# Patient Record
(demographics unavailable — no encounter records)

---

## 2024-10-22 NOTE — PLAN
[FreeTextEntry1] : EKG for HLD- NSR   imp---hypertension---normotensive on amlodipine   hyperlipidemia---good panel on atorvastatin   hypothyroidism---clinically and biochem, euthyroid   mitral regurgitation---MVP and mild MR on echo in Aug. 2020    plan--FBW reviewed with pt.   continue current meds   echo Aug. 2023 (MR)   #severe osteopenia  s/p fosamax  bone scan 2023 negative   [ ] shingrix     I spent a total of 35 minutes on the date of this encounter that EXCLUDES time spent on AWV, preventive exam, depression screening, tobacco cessation, lung cancer screening and behavioral counseling.  This additional time included: Preparing to see the patient (e.g., review of test results) Obtaining and/or reviewing separately obtained history Performing a medically appropriate exam and/or evaluation Counseling and educating the patient/family/caregiver Ordering medications, tests, procedures Referring and communicating with other healthcare professionals, when not separately reported Documenting clinical information in the health record Care coordination not separately reported

## 2024-10-22 NOTE — HEALTH RISK ASSESSMENT
[Good] : ~his/her~  mood as  good [Yes] : Yes [4 or more  times a week (4 pts)] : 4 or more  times a week (4 points) [No] : In the past 12 months have you used drugs other than those required for medical reasons? No [No falls in past year] : Patient reported no falls in the past year [0] : 2) Feeling down, depressed, or hopeless: Not at all (0) [PHQ-2 Negative - No further assessment needed] : PHQ-2 Negative - No further assessment needed [Time Spent: ___ Minutes] : I spent [unfilled] minutes performing a depression screening for this patient. [Never] : Never [HIV Test offered] : HIV Test offered [Hepatitis C test offered] : Hepatitis C test offered [None] : None [With Family] : lives with family [# of Members in Household ___] :  household currently consist of [unfilled] member(s) [Retired] : retired [College] : College [] :  [# Of Children ___] : has [unfilled] children [Feels Safe at Home] : Feels safe at home [Fully functional (bathing, dressing, toileting, transferring, walking, feeding)] : Fully functional (bathing, dressing, toileting, transferring, walking, feeding) [Fully functional (using the telephone, shopping, preparing meals, housekeeping, doing laundry, using] : Fully functional and needs no help or supervision to perform IADLs (using the telephone, shopping, preparing meals, housekeeping, doing laundry, using transportation, managing medications and managing finances) [Reports normal functional visual acuity (ie: able to read med bottle)] : Reports normal functional visual acuity [Smoke Detector] : smoke detector [Carbon Monoxide Detector] : carbon monoxide detector [Seat Belt] :  uses seat belt [Sunscreen] : uses sunscreen [de-identified] : none [de-identified] : glass wine per day [de-identified] : exercise daily, walk  [de-identified] : veggies, fruits, 1 egg per day; yogurt, lean beef, chicken, whole wheat pasta, etc [[id number]] : 0 [Change in mental status noted] : No change in mental status noted [High Risk Behavior] : no high risk behavior [Reports changes in hearing] : Reports no changes in hearing [Reports changes in vision] : Reports no changes in vision [Reports changes in dental health] : Reports no changes in dental health [Travel to Developing Areas] : does not  travel to developing areas [TB Exposure] : is not being exposed to tuberculosis [ColonoscopyDate] : 6/23 [ColonoscopyComments] : 6/28 [FreeTextEntry2] :

## 2024-10-22 NOTE — HISTORY OF PRESENT ILLNESS
[FreeTextEntry1] : Patient presents today for follow-up of chronic medical conditions.  [de-identified] : Patient presents today for follow-up of chronic medical conditions.  Reports recent angular chelitis - requests treatment  Wife has returned to hospice - feels mood is stable, overall content with his daily routine   Still with intermittent belching ?Coffee trigger  Trial of PPI unsuccessful   Prior -  His wife suffers from late stage alzheimer's  Patient has been in good overall health this past year and has no active complaints. All data, labs, consult notes and studies reviewed.

## 2025-02-25 NOTE — HEALTH RISK ASSESSMENT
[Good] : ~his/her~  mood as  good [Yes] : Yes [4 or more  times a week (4 pts)] : 4 or more  times a week (4 points) [No] : In the past 12 months have you used drugs other than those required for medical reasons? No [No falls in past year] : Patient reported no falls in the past year [0] : 2) Feeling down, depressed, or hopeless: Not at all (0) [PHQ-2 Negative - No further assessment needed] : PHQ-2 Negative - No further assessment needed [Time Spent: ___ Minutes] : I spent [unfilled] minutes performing a depression screening for this patient. [Never] : Never [HIV Test offered] : HIV Test offered [Hepatitis C test offered] : Hepatitis C test offered [None] : None [With Family] : lives with family [# of Members in Household ___] :  household currently consist of [unfilled] member(s) [Retired] : retired [College] : College [] :  [# Of Children ___] : has [unfilled] children [Feels Safe at Home] : Feels safe at home [Fully functional (bathing, dressing, toileting, transferring, walking, feeding)] : Fully functional (bathing, dressing, toileting, transferring, walking, feeding) [Fully functional (using the telephone, shopping, preparing meals, housekeeping, doing laundry, using] : Fully functional and needs no help or supervision to perform IADLs (using the telephone, shopping, preparing meals, housekeeping, doing laundry, using transportation, managing medications and managing finances) [Reports normal functional visual acuity (ie: able to read med bottle)] : Reports normal functional visual acuity [Smoke Detector] : smoke detector [Carbon Monoxide Detector] : carbon monoxide detector [Seat Belt] :  uses seat belt [Sunscreen] : uses sunscreen [de-identified] : none [de-identified] : glass wine per day [de-identified] : exercise daily, walk  [de-identified] : veggies, fruits, 1 egg per day; yogurt, lean beef, chicken, whole wheat pasta, etc [[id number]] : 0 [Change in mental status noted] : No change in mental status noted [High Risk Behavior] : no high risk behavior [Reports changes in hearing] : Reports no changes in hearing [Reports changes in vision] : Reports no changes in vision [Reports changes in dental health] : Reports no changes in dental health [Travel to Developing Areas] : does not  travel to developing areas [TB Exposure] : is not being exposed to tuberculosis [ColonoscopyDate] : 6/23 [ColonoscopyComments] : 6/28 [FreeTextEntry2] :

## 2025-02-25 NOTE — HISTORY OF PRESENT ILLNESS
[FreeTextEntry1] : Patient presents today for follow-up of chronic medical conditions.  [de-identified] : Patient presents today for follow-up of chronic medical conditions.  Has been feeling okay - no acute concerns  Reports recent angular chelitis - requests treatment  Wife has returned to hospice - feels mood is stable, overall content with his daily routine   Still with intermittent belching ?Coffee trigger  Trial of PPI unsuccessful   Prior -  His wife suffers from late stage alzheimer's  Patient has been in good overall health this past year and has no active complaints. All data, labs, consult notes and studies reviewed.

## 2025-07-01 NOTE — HEALTH RISK ASSESSMENT
[Good] : ~his/her~  mood as  good [4 or more  times a week (4 pts)] : 4 or more  times a week (4 points) [No] : In the past 12 months have you used drugs other than those required for medical reasons? No [No falls in past year] : Patient reported no falls in the past year [0] : 2) Feeling down, depressed, or hopeless: Not at all (0) [PHQ-2 Negative - No further assessment needed] : PHQ-2 Negative - No further assessment needed [Time Spent: ___ Minutes] : I spent [unfilled] minutes performing a depression screening for this patient. [Never] : Never [HIV Test offered] : HIV Test offered [Hepatitis C test offered] : Hepatitis C test offered [With Family] : lives with family [# of Members in Household ___] :  household currently consist of [unfilled] member(s) [Retired] : retired [College] : College [] :  [# Of Children ___] : has [unfilled] children [Feels Safe at Home] : Feels safe at home [Fully functional (bathing, dressing, toileting, transferring, walking, feeding)] : Fully functional (bathing, dressing, toileting, transferring, walking, feeding) [Fully functional (using the telephone, shopping, preparing meals, housekeeping, doing laundry, using] : Fully functional and needs no help or supervision to perform IADLs (using the telephone, shopping, preparing meals, housekeeping, doing laundry, using transportation, managing medications and managing finances) [Reports normal functional visual acuity (ie: able to read med bottle)] : Reports normal functional visual acuity [Smoke Detector] : smoke detector [Carbon Monoxide Detector] : carbon monoxide detector [Seat Belt] :  uses seat belt [Sunscreen] : uses sunscreen [None] : Patient does not have any barriers to medication adherence [Yes] : Reviewed medication list for presence of high-risk medications. [Opioids] : opioids [NO] : No [de-identified] : none [de-identified] : glass wine per day [de-identified] : exercise daily, walk  [de-identified] : veggies, fruits, 1 egg per day; yogurt, lean beef, chicken, whole wheat pasta, etc [[id number]] : 0 [Change in mental status noted] : No change in mental status noted [High Risk Behavior] : no high risk behavior [Reports changes in hearing] : Reports no changes in hearing [Reports changes in vision] : Reports no changes in vision [Reports changes in dental health] : Reports no changes in dental health [Travel to Developing Areas] : does not  travel to developing areas [TB Exposure] : is not being exposed to tuberculosis [ColonoscopyDate] : 6/23 [ColonoscopyComments] : 6/28 [FreeTextEntry2] :

## 2025-07-01 NOTE — PLAN
[FreeTextEntry1] : EKG obtained to assist in diagnosis and management of assessed problem(s).    EKG shows sinus bradycardia without acute ischemic changes.  imp---hypertension---normotensive on amlodipine   hyperlipidemia---good panel on atorvastatin   hypothyroidism---clinically and biochem, euthyroid   mitral regurgitation---MVP and mild MR on echo in Aug. 2020    plan--FBW reviewed with pt.  continue current meds  echo Aug. 2023 (MR)  #severe osteopenia s/p fosamax bone scan 2023 negative   [ ] shingrix     I spent a total of 40 minutes on the date of this encounter that EXCLUDES time spent on AWV, preventive exam, depression screening, tobacco cessation, lung cancer screening and behavioral counseling.  This additional time included: Preparing to see the patient (e.g., review of test results) Obtaining and/or reviewing separately obtained history Performing a medically appropriate exam and/or evaluation Counseling and educating the patient/family/caregiver Ordering medications, tests, procedures Referring and communicating with other healthcare professionals, when not separately reported Documenting clinical information in the health record Care coordination not separately reported

## 2025-07-01 NOTE — HISTORY OF PRESENT ILLNESS
[FreeTextEntry1] : LISSY [de-identified] : Patient presents today for follow-up of chronic medical conditions.  Thinks his word retrieval is getting slightly worse  Reports recent angular chelitis - requests treatment  Wife has returned to hospice - feels mood is stable, overall content with his daily routine   Still with intermittent belching ?Coffee trigger  Trial of PPI unsuccessful   Prior -  His wife suffers from late stage alzheimer's  Patient has been in good overall health this past year and has no active complaints. All data, labs, consult notes and studies reviewed.